# Patient Record
(demographics unavailable — no encounter records)

---

## 2025-04-11 NOTE — HISTORY OF PRESENT ILLNESS
[Urinary Frequency] : urinary frequency [None] : None [FreeTextEntry1] : 68 y/o man Hx of elevated PSA. Has since stopped cycling due to how it affects his PSA- now running  Previously, he was given Flomax 0.8 by PCP before trip as a precaution.  Pt did not have any issues urinating before starting it and does not see any improvement while on it.  He stopped Tamsulosin last year since there was no changes in urination.   He is now complaining of urinary frequency every 1.5 hr  He drinks 3 cups of caffeinated tea in the morning Nighttime urinary frequency improved after he stopped tea after 4 pm  Pt also complaining of left groin pain/discomfort that is not bothersome but he is anxious  8/2022 MRI prostate findings discussed with patient:  47 cc, PI-RADS 3 lesion left posterior medial mid gland to apex PSA density 0.03   PSA  1.7 January 2024- verbally as per patient.  1.50 March 2023 1.34 August 2022 2.45 February 2022 1.4   March 2021 October 2024- partial left nephrectomy- at [facility] [place] left renal mass measuring 4.4 cm [place] 4. He is being followed there.   [Dysuria] : no dysuria [Hematuria - Gross] : no gross hematuria

## 2025-04-11 NOTE — HISTORY OF PRESENT ILLNESS
[Urinary Frequency] : urinary frequency [None] : None [FreeTextEntry1] : 68 y/o man Hx of elevated PSA. Has since stopped cycling due to how it affects his PSA- now running  Previously, he was given Flomax 0.8 by PCP before trip as a precaution.  Pt did not have any issues urinating before starting it and does not see any improvement while on it.  He stopped Tamsulosin last year since there was no changes in urination.   He is now complaining of urinary frequency every 1.5 hr  He drinks 3 cups of caffeinated tea in the morning Nighttime urinary frequency improved after he stopped tea after 4 pm  Pt also complaining of left groin pain/discomfort that is not bothersome but he is anxious  8/2022 MRI prostate findings discussed with patient:  47 cc, PI-RADS 3 lesion left posterior medial mid gland to apex PSA density 0.03   PSA  1.7 January 2024- verbally as per patient.  1.50 March 2023 1.34 August 2022 2.45 February 2022 1.4   March 2021 October 2024- partial left nephrectomy- at [facility] [place] left renal mass measuring 4.4 cm [place] 4. He is being followed there.   [Dysuria] : no dysuria [Hematuria - Gross] : no gross hematuria

## 2025-04-11 NOTE — ASSESSMENT
[FreeTextEntry1] : BPH PVR- 23 ml   Restart Flomax 0.4 mg at bedtime  Urinary frequency -reduce tea/caffeinated beverages consumption   left groin pain physical assessment- WNL. No hernia  reassured patient  Plan: PSA today  12 months follow up pending PSA stability

## 2025-04-11 NOTE — PHYSICAL EXAM
[General Appearance - Well Developed] : well developed [] : no respiratory distress [Urinary Bladder Findings] : the bladder was normal on palpation [Normal Station and Gait] : the gait and station were normal for the patient's age [Oriented To Time, Place, And Person] : oriented to person, place, and time [Penis Abnormality] : normal circumcised penis [Epididymis] : the epididymides were normal [Testes Tenderness] : no tenderness of the testes [Chaperone Present] : A chaperone was present in the examining room during all aspects of the physical examination [FreeTextEntry2] : Yessica Yang NP